# Patient Record
Sex: FEMALE | URBAN - METROPOLITAN AREA
[De-identification: names, ages, dates, MRNs, and addresses within clinical notes are randomized per-mention and may not be internally consistent; named-entity substitution may affect disease eponyms.]

---

## 2020-09-11 ENCOUNTER — APPOINTMENT (RX ONLY)
Dept: URBAN - METROPOLITAN AREA CLINIC 251 | Facility: CLINIC | Age: 20
Setting detail: DERMATOLOGY
End: 2020-09-11

## 2020-09-11 DIAGNOSIS — L21.8 OTHER SEBORRHEIC DERMATITIS: ICD-10-CM

## 2020-09-11 DIAGNOSIS — L20.89 OTHER ATOPIC DERMATITIS: ICD-10-CM

## 2020-09-11 PROCEDURE — 99213 OFFICE O/P EST LOW 20 MIN: CPT

## 2020-09-11 PROCEDURE — ? ADDITIONAL NOTES

## 2020-09-11 PROCEDURE — ? DUPIXENT MONITORING

## 2020-09-11 PROCEDURE — ? COUNSELING

## 2020-09-11 PROCEDURE — ? PRESCRIPTION

## 2020-09-11 RX ORDER — MONTELUKAST SODIUM 10 MG/1
TABLET, FILM COATED ORAL
Qty: 90 | Refills: 0 | Status: ERX | COMMUNITY
Start: 2020-09-11

## 2020-09-11 RX ORDER — KETOCONAZOLE 20 MG/ML
SHAMPOO, SUSPENSION TOPICAL
Qty: 3 | Refills: 3 | Status: ERX | COMMUNITY
Start: 2020-09-11

## 2020-09-11 RX ORDER — PIMECROLIMUS 10 MG/G
CREAM TOPICAL BID
Qty: 180 | Refills: 0 | Status: ERX | COMMUNITY
Start: 2020-09-11

## 2020-09-11 RX ADMIN — MONTELUKAST SODIUM: 10 TABLET, FILM COATED ORAL at 00:00

## 2020-09-11 RX ADMIN — KETOCONAZOLE: 20 SHAMPOO, SUSPENSION TOPICAL at 00:00

## 2020-09-11 RX ADMIN — PIMECROLIMUS: 10 CREAM TOPICAL at 00:00

## 2020-09-11 ASSESSMENT — LOCATION DETAILED DESCRIPTION DERM
LOCATION DETAILED: LEFT MEDIAL SUPERIOR EYELID
LOCATION DETAILED: MID-OCCIPITAL SCALP
LOCATION DETAILED: RIGHT MEDIAL TRAPEZIAL NECK
LOCATION DETAILED: RIGHT ANTECUBITAL SKIN
LOCATION DETAILED: LEFT LATERAL SUPERIOR EYELID

## 2020-09-11 ASSESSMENT — LOCATION ZONE DERM
LOCATION ZONE: ARM
LOCATION ZONE: NECK
LOCATION ZONE: EYELID
LOCATION ZONE: SCALP

## 2020-09-11 ASSESSMENT — LOCATION SIMPLE DESCRIPTION DERM
LOCATION SIMPLE: POSTERIOR SCALP
LOCATION SIMPLE: LEFT SUPERIOR EYELID
LOCATION SIMPLE: RIGHT UPPER ARM
LOCATION SIMPLE: POSTERIOR NECK

## 2020-09-11 NOTE — HPI: RASH (ECZEMA)
Is This A New Presentation, Or A Follow-Up?: Follow Up Eczema
Additional History: Disliked use of Eucrisa.

## 2020-12-11 ENCOUNTER — APPOINTMENT (RX ONLY)
Dept: URBAN - METROPOLITAN AREA CLINIC 251 | Facility: CLINIC | Age: 20
Setting detail: DERMATOLOGY
End: 2020-12-11

## 2020-12-11 DIAGNOSIS — L20.89 OTHER ATOPIC DERMATITIS: ICD-10-CM | Status: WELL CONTROLLED

## 2020-12-11 DIAGNOSIS — L21.8 OTHER SEBORRHEIC DERMATITIS: ICD-10-CM | Status: WELL CONTROLLED

## 2020-12-11 PROCEDURE — ? COUNSELING

## 2020-12-11 PROCEDURE — ? DUPIXENT MONITORING

## 2020-12-11 PROCEDURE — ? PRESCRIPTION

## 2020-12-11 PROCEDURE — ? ADDITIONAL NOTES

## 2020-12-11 PROCEDURE — 99213 OFFICE O/P EST LOW 20 MIN: CPT

## 2020-12-11 PROCEDURE — ? RECOMMENDATIONS

## 2020-12-11 RX ORDER — HYDROCORTISONE 25 MG/G
OINTMENT TOPICAL
Qty: 3 | Refills: 0 | Status: ERX | COMMUNITY
Start: 2020-12-11

## 2020-12-11 RX ADMIN — HYDROCORTISONE: 25 OINTMENT TOPICAL at 00:00

## 2020-12-11 ASSESSMENT — LOCATION SIMPLE DESCRIPTION DERM
LOCATION SIMPLE: RIGHT UPPER ARM
LOCATION SIMPLE: RIGHT WRIST
LOCATION SIMPLE: POSTERIOR SCALP

## 2020-12-11 ASSESSMENT — LOCATION DETAILED DESCRIPTION DERM
LOCATION DETAILED: RIGHT VENTRAL WRIST
LOCATION DETAILED: RIGHT DORSAL WRIST
LOCATION DETAILED: RIGHT ANTERIOR DISTAL UPPER ARM
LOCATION DETAILED: POSTERIOR MID-PARIETAL SCALP

## 2020-12-11 ASSESSMENT — LOCATION ZONE DERM
LOCATION ZONE: SCALP
LOCATION ZONE: ARM

## 2020-12-11 NOTE — PROCEDURE: RECOMMENDATIONS
Detail Level: Zone
Recommendations (Free Text): Continue Ketoconazole Shampoo to cleanse scalp as needed.
Recommendation Preamble: The following recommendations were made during the visit:

## 2021-01-21 ENCOUNTER — RX ONLY (OUTPATIENT)
Age: 21
Setting detail: RX ONLY
End: 2021-01-21

## 2021-01-21 RX ORDER — DUPILUMAB 300 MG/2ML
300MG SC INJECTION, SOLUTION SUBCUTANEOUS EVERY OTHER WEEK
Qty: 6 | Refills: 0 | Status: ERX | COMMUNITY
Start: 2021-01-21

## 2021-03-12 ENCOUNTER — RX ONLY (OUTPATIENT)
Age: 21
Setting detail: RX ONLY
End: 2021-03-12

## 2021-03-12 ENCOUNTER — APPOINTMENT (RX ONLY)
Dept: URBAN - METROPOLITAN AREA CLINIC 251 | Facility: CLINIC | Age: 21
Setting detail: DERMATOLOGY
End: 2021-03-12

## 2021-03-12 DIAGNOSIS — L20.89 OTHER ATOPIC DERMATITIS: ICD-10-CM | Status: WELL CONTROLLED

## 2021-03-12 PROCEDURE — ? TREATMENT REGIMEN

## 2021-03-12 PROCEDURE — 99213 OFFICE O/P EST LOW 20 MIN: CPT | Mod: 95

## 2021-03-12 PROCEDURE — ? DUPIXENT MONITORING

## 2021-03-12 PROCEDURE — ? COUNSELING

## 2021-03-12 PROCEDURE — ? ADDITIONAL NOTES

## 2021-03-12 RX ORDER — HYDROCORTISONE 25 MG/G
OINTMENT TOPICAL
Qty: 3 | Refills: 0 | Status: ERX

## 2021-03-12 RX ORDER — DUPILUMAB 300 MG/2ML
INJECTION, SOLUTION SUBCUTANEOUS EVERY OTHER WEEK
Qty: 6 | Refills: 0 | Status: ERX

## 2021-03-12 RX ORDER — MONTELUKAST SODIUM 10 MG/1
TABLET, FILM COATED ORAL
Qty: 90 | Refills: 0 | Status: ERX

## 2021-03-12 ASSESSMENT — LOCATION DETAILED DESCRIPTION DERM
LOCATION DETAILED: LEFT CENTRAL MALAR CHEEK
LOCATION DETAILED: RIGHT CENTRAL MALAR CHEEK
LOCATION DETAILED: RIGHT ANTECUBITAL SKIN

## 2021-03-12 ASSESSMENT — LOCATION ZONE DERM
LOCATION ZONE: ARM
LOCATION ZONE: FACE

## 2021-03-12 ASSESSMENT — LOCATION SIMPLE DESCRIPTION DERM
LOCATION SIMPLE: RIGHT UPPER ARM
LOCATION SIMPLE: LEFT CHEEK
LOCATION SIMPLE: RIGHT CHEEK

## 2021-03-12 NOTE — PROCEDURE: ADDITIONAL NOTES
Detail Level: Simple
Additional Notes: After discussion of the risks, benefits and limitations with respect to this Telehealth visit, including potential limitations in picture and video quality, the patient has given verbal consent to proceed with this Telehealth visit. \\nInteractive audio and video telecommunications were used to permit real-time communication between myself and the patient.\\nThis Telehealth visit was performed due to the national COVID-19 emergency and recommended social distancing.
Render Risk Assessment In Note?: no
Additional Notes: The [employer] video caller served as the audio-video platform for today's telehealth visit.

## 2021-03-12 NOTE — PROCEDURE: TREATMENT REGIMEN
Continue Regimen: Dupixent Q 2 weeks (refilled today)\\nHC 2.5% citlalli BID to face and groin prn (refilled today)\\nSingular 10mg QD (refilled today)\\nTriamcinolone to affected areas prn
Show Skinceuticals Line: Yes
Action 3: Continue
Detail Level: Zone

## 2021-07-08 ENCOUNTER — APPOINTMENT (RX ONLY)
Dept: URBAN - METROPOLITAN AREA CLINIC 251 | Facility: CLINIC | Age: 21
Setting detail: DERMATOLOGY
End: 2021-07-08

## 2021-07-08 ENCOUNTER — RX ONLY (OUTPATIENT)
Age: 21
Setting detail: RX ONLY
End: 2021-07-08

## 2021-07-08 DIAGNOSIS — L20.89 OTHER ATOPIC DERMATITIS: ICD-10-CM | Status: WELL CONTROLLED

## 2021-07-08 PROCEDURE — ? TREATMENT REGIMEN

## 2021-07-08 PROCEDURE — ? COUNSELING

## 2021-07-08 PROCEDURE — ? DUPIXENT MONITORING

## 2021-07-08 PROCEDURE — 99213 OFFICE O/P EST LOW 20 MIN: CPT | Mod: 95

## 2021-07-08 PROCEDURE — ? ADDITIONAL NOTES

## 2021-07-08 RX ORDER — TRIAMCINOLONE ACETONIDE 1 MG/G
CREAM TOPICAL
Qty: 1 | Refills: 0 | Status: ERX | COMMUNITY
Start: 2021-07-08

## 2021-07-08 RX ORDER — MONTELUKAST SODIUM 10 MG/1
TABLET, FILM COATED ORAL
Qty: 90 | Refills: 0 | Status: ERX

## 2021-07-08 RX ORDER — DUPILUMAB 300 MG/2ML
INJECTION, SOLUTION SUBCUTANEOUS EVERY OTHER WEEK
Qty: 6 | Refills: 0 | Status: ERX

## 2021-07-08 RX ORDER — HYDROCORTISONE 25 MG/G
OINTMENT TOPICAL
Qty: 3 | Refills: 0 | Status: ERX

## 2021-07-08 ASSESSMENT — LOCATION DETAILED DESCRIPTION DERM
LOCATION DETAILED: LEFT LATERAL SUPERIOR EYELID
LOCATION DETAILED: RIGHT LATERAL SUPERIOR EYELID
LOCATION DETAILED: LEFT MEDIAL SUPERIOR EYELID
LOCATION DETAILED: LEFT UPPER CUTANEOUS LIP
LOCATION DETAILED: RIGHT ANTECUBITAL SKIN
LOCATION DETAILED: LEFT ANTECUBITAL SKIN
LOCATION DETAILED: RIGHT MEDIAL SUPERIOR EYELID
LOCATION DETAILED: RIGHT UPPER CUTANEOUS LIP

## 2021-07-08 ASSESSMENT — LOCATION SIMPLE DESCRIPTION DERM
LOCATION SIMPLE: LEFT LIP
LOCATION SIMPLE: LEFT SUPERIOR EYELID
LOCATION SIMPLE: LEFT UPPER ARM
LOCATION SIMPLE: RIGHT UPPER ARM
LOCATION SIMPLE: RIGHT LIP
LOCATION SIMPLE: RIGHT SUPERIOR EYELID

## 2021-07-08 ASSESSMENT — LOCATION ZONE DERM
LOCATION ZONE: LIP
LOCATION ZONE: ARM
LOCATION ZONE: EYELID

## 2021-07-08 NOTE — PROCEDURE: TREATMENT REGIMEN
Show Aquaphor Line: Yes
Action 4: Continue
Detail Level: Zone
Continue Regimen: Dupixent Q 2 weeks, Singular QAM, HC 2.5% prn, TMC 0.1% prn (all refilled today)

## 2021-07-08 NOTE — PROCEDURE: ADDITIONAL NOTES
Additional Notes: After discussion of the risks, benefits and limitations with respect to this Telehealth visit, including potential limitations in picture and video quality, the patient has given verbal consent to proceed with this Telehealth visit. \\nInteractive audio and video telecommunications were used to permit real-time communication between myself and the patient.\\nThis Telehealth visit was performed due to the national COVID-19 emergency and recommended social distancing.
Render Risk Assessment In Note?: no
Detail Level: Simple
Additional Notes: The [employer] video caller served as the audio-video platform for today's telehealth visit.

## 2021-10-11 ENCOUNTER — RX ONLY (OUTPATIENT)
Age: 21
Setting detail: RX ONLY
End: 2021-10-11

## 2021-10-11 RX ORDER — DUPILUMAB 300 MG/2ML
INJECTION, SOLUTION SUBCUTANEOUS
Qty: 3 | Refills: 0 | Status: ERX | COMMUNITY
Start: 2021-10-11

## 2022-02-24 ENCOUNTER — APPOINTMENT (RX ONLY)
Dept: URBAN - METROPOLITAN AREA CLINIC 251 | Facility: CLINIC | Age: 22
Setting detail: DERMATOLOGY
End: 2022-02-24

## 2022-02-24 DIAGNOSIS — L20.89 OTHER ATOPIC DERMATITIS: ICD-10-CM | Status: STABLE

## 2022-02-24 PROCEDURE — ? TREATMENT REGIMEN

## 2022-02-24 PROCEDURE — ? ADDITIONAL NOTES

## 2022-02-24 PROCEDURE — 99213 OFFICE O/P EST LOW 20 MIN: CPT | Mod: 95

## 2022-02-24 PROCEDURE — ? DUPIXENT MONITORING

## 2022-02-24 PROCEDURE — ? COUNSELING

## 2022-02-24 PROCEDURE — ? PRESCRIPTION

## 2022-02-24 RX ORDER — MONTELUKAST SODIUM 10 MG/1
TABLET, FILM COATED ORAL
Qty: 90 | Refills: 0 | Status: ERX

## 2022-02-24 RX ORDER — KETOCONAZOLE 20 MG/ML
SHAMPOO, SUSPENSION TOPICAL
Qty: 360 | Refills: 3 | Status: ERX

## 2022-02-24 RX ORDER — DUPILUMAB 300 MG/2ML
INJECTION, SOLUTION SUBCUTANEOUS
Qty: 6 | Refills: 0 | Status: ERX

## 2022-02-24 ASSESSMENT — LOCATION DETAILED DESCRIPTION DERM
LOCATION DETAILED: LEFT VENTRAL PROXIMAL FOREARM
LOCATION DETAILED: LEFT RADIAL DORSAL HAND
LOCATION DETAILED: RIGHT ANTECUBITAL SKIN

## 2022-02-24 ASSESSMENT — LOCATION SIMPLE DESCRIPTION DERM
LOCATION SIMPLE: LEFT FOREARM
LOCATION SIMPLE: LEFT HAND
LOCATION SIMPLE: RIGHT UPPER ARM

## 2022-02-24 ASSESSMENT — LOCATION ZONE DERM
LOCATION ZONE: ARM
LOCATION ZONE: HAND

## 2022-02-24 ASSESSMENT — SEVERITY ASSESSMENT 2020: SEVERITY 2020: MILD

## 2022-02-24 NOTE — PROCEDURE: ADDITIONAL NOTES
Additional Notes: After discussion of the risks, benefits and limitations with respect to this Telehealth visit, including potential limitations in picture and video quality, the patient has given verbal consent to proceed with this Telehealth visit. \\nInteractive audio and video telecommunications were used to permit real-time communication between myself and the patient.\\nThis Telehealth visit was performed due to the national COVID-19 emergency and recommended social distancing.
Render Risk Assessment In Note?: no
Detail Level: Simple
Additional Notes: The [employer] video caller served as the audio-video platform for today's telehealth visit.

## 2022-05-17 ENCOUNTER — APPOINTMENT (RX ONLY)
Dept: URBAN - METROPOLITAN AREA CLINIC 251 | Facility: CLINIC | Age: 22
Setting detail: DERMATOLOGY
End: 2022-05-17

## 2022-05-17 DIAGNOSIS — L20.89 OTHER ATOPIC DERMATITIS: ICD-10-CM | Status: WELL CONTROLLED

## 2022-05-17 PROCEDURE — 99213 OFFICE O/P EST LOW 20 MIN: CPT | Mod: 95

## 2022-05-17 PROCEDURE — ? PRESCRIPTION

## 2022-05-17 PROCEDURE — ? ADDITIONAL NOTES

## 2022-05-17 PROCEDURE — ? COUNSELING

## 2022-05-17 RX ORDER — TRIAMCINOLONE ACETONIDE 1 MG/G
CREAM TOPICAL
Qty: 454 | Refills: 0 | Status: ERX | COMMUNITY
Start: 2022-05-17

## 2022-05-17 RX ORDER — FLUTICASONE PROPIONATE 0.05 MG/G
OINTMENT TOPICAL
Qty: 15 | Refills: 0 | Status: ERX | COMMUNITY
Start: 2022-05-17

## 2022-05-17 RX ORDER — DUPILUMAB 300 MG/2ML
INJECTION, SOLUTION SUBCUTANEOUS
Qty: 6 | Refills: 0 | Status: ERX | COMMUNITY
Start: 2022-05-17

## 2022-05-17 RX ADMIN — TRIAMCINOLONE ACETONIDE: 1 CREAM TOPICAL at 00:00

## 2022-05-17 RX ADMIN — DUPILUMAB: 300 INJECTION, SOLUTION SUBCUTANEOUS at 00:00

## 2022-05-17 RX ADMIN — FLUTICASONE PROPIONATE: 0.05 OINTMENT TOPICAL at 00:00

## 2022-05-17 ASSESSMENT — LOCATION ZONE DERM
LOCATION ZONE: AXILLAE
LOCATION ZONE: ARM
LOCATION ZONE: EYELID

## 2022-05-17 ASSESSMENT — LOCATION SIMPLE DESCRIPTION DERM
LOCATION SIMPLE: LEFT SUPERIOR EYELID
LOCATION SIMPLE: RIGHT WRIST
LOCATION SIMPLE: LEFT AXILLARY VAULT
LOCATION SIMPLE: RIGHT SUPERIOR EYELID
LOCATION SIMPLE: LEFT WRIST
LOCATION SIMPLE: RIGHT AXILLARY VAULT

## 2022-05-17 ASSESSMENT — LOCATION DETAILED DESCRIPTION DERM
LOCATION DETAILED: RIGHT VENTRAL WRIST
LOCATION DETAILED: LEFT MEDIAL SUPERIOR EYELID
LOCATION DETAILED: LEFT AXILLARY VAULT
LOCATION DETAILED: RIGHT MEDIAL SUPERIOR EYELID
LOCATION DETAILED: RIGHT AXILLARY VAULT
LOCATION DETAILED: LEFT VENTRAL WRIST

## 2022-05-17 NOTE — PROCEDURE: ADDITIONAL NOTES
Detail Level: Simple
Render Risk Assessment In Note?: no
Additional Notes: After discussion of the risks, benefits and limitations with respect to this Telehealth visit, including potential limitations in picture and video quality, the patient has given verbal consent to proceed with this Telehealth visit. \\nInteractive audio and video telecommunications were used to permit real-time communication between myself and the patient.\\nThis Telehealth visit was performed due to the national COVID-19 emergency and recommended social distancing.
Additional Notes: The [place] [employer] video caller served as the audio-video platform for today's telehealth visit.

## 2022-08-08 RX ORDER — DUPILUMAB 300 MG/2ML
INJECTION, SOLUTION SUBCUTANEOUS
Qty: 6 | Refills: 0 | Status: ERX

## 2022-11-10 ENCOUNTER — APPOINTMENT (RX ONLY)
Dept: URBAN - METROPOLITAN AREA CLINIC 251 | Facility: CLINIC | Age: 22
Setting detail: DERMATOLOGY
End: 2022-11-10

## 2022-11-10 DIAGNOSIS — L20.89 OTHER ATOPIC DERMATITIS: ICD-10-CM | Status: INADEQUATELY CONTROLLED

## 2022-11-10 PROCEDURE — ? ORDER TESTS

## 2022-11-10 PROCEDURE — 99214 OFFICE O/P EST MOD 30 MIN: CPT | Mod: 95

## 2022-11-10 PROCEDURE — ? DUPIXENT MONITORING

## 2022-11-10 PROCEDURE — ? PRESCRIPTION

## 2022-11-10 PROCEDURE — ? ADDITIONAL NOTES

## 2022-11-10 PROCEDURE — ? CIBINQO INITIATION

## 2022-11-10 PROCEDURE — ? COUNSELING

## 2022-11-10 RX ORDER — DUPILUMAB 300 MG/2ML
INJECTION, SOLUTION SUBCUTANEOUS
Qty: 12 | Refills: 0 | Status: ERX | COMMUNITY
Start: 2022-11-10

## 2022-11-10 RX ADMIN — DUPILUMAB: 300 INJECTION, SOLUTION SUBCUTANEOUS at 00:00

## 2022-11-10 NOTE — PROCEDURE: CIBINQO INITIATION
Detail Level: Zone
Cibinqo Dosing: 100mg Daily
Add Pregnancy And Lactation Warning To Medication Counseling?: No
Diagnosis (Required): Atopic Dermatitis/Eczematous Dermatitis
Pregnancy And Lactation Warning Text: It is unknown if this medication will adversely affect pregnancy or breast feeding.  You should not take this medication if you are currently pregnant or planning a pregnancy or while breastfeeding.
Cibinqo Monitoring Guidelines: CBC, lipids, hepatitis screening, and TB screening should be checked prior to initiating Cibinqo therapy.  Labs may also be checked periodically after the initiation period.

## 2022-11-10 NOTE — PROCEDURE: ADDITIONAL NOTES
Render Risk Assessment In Note?: no
Detail Level: Simple
Additional Notes: After discussion of the risks, benefits and limitations with respect to this Telehealth visit, including potential limitations in picture and video quality, the patient has given verbal consent to proceed with this Telehealth visit. \\nInteractive audio and video telecommunications were used to permit real-time communication between myself and the patient.
Additional Notes: The [employer] video caller served as the audio-video platform for today's telehealth visit.

## 2022-11-10 NOTE — PROCEDURE: ORDER TESTS
Expected Date Of Service: 11/10/2022
Billing Type: Third-Party Bill
Bill For Surgical Tray: no
Performing Laboratory: 0